# Patient Record
Sex: FEMALE | Race: WHITE | NOT HISPANIC OR LATINO | Employment: OTHER
[De-identification: names, ages, dates, MRNs, and addresses within clinical notes are randomized per-mention and may not be internally consistent; named-entity substitution may affect disease eponyms.]

---

## 2019-10-29 ENCOUNTER — RECORDS - HEALTHEAST (OUTPATIENT)
Dept: ADMINISTRATIVE | Facility: OTHER | Age: 64
End: 2019-10-29

## 2019-10-30 ENCOUNTER — COMMUNICATION - HEALTHEAST (OUTPATIENT)
Dept: TELEHEALTH | Facility: CLINIC | Age: 64
End: 2019-10-30

## 2019-10-30 ENCOUNTER — RECORDS - HEALTHEAST (OUTPATIENT)
Dept: VASCULAR ULTRASOUND | Facility: CLINIC | Age: 64
End: 2019-10-30

## 2019-10-30 DIAGNOSIS — M25.469 EFFUSION, UNSPECIFIED KNEE: ICD-10-CM

## 2022-10-10 ENCOUNTER — OFFICE VISIT (OUTPATIENT)
Dept: NEUROLOGY | Facility: CLINIC | Age: 67
End: 2022-10-10
Payer: COMMERCIAL

## 2022-10-10 ENCOUNTER — APPOINTMENT (OUTPATIENT)
Dept: LAB | Facility: CLINIC | Age: 67
End: 2022-10-10
Payer: COMMERCIAL

## 2022-10-10 VITALS — HEART RATE: 66 BPM | SYSTOLIC BLOOD PRESSURE: 113 MMHG | DIASTOLIC BLOOD PRESSURE: 86 MMHG

## 2022-10-10 DIAGNOSIS — G35 MS (MULTIPLE SCLEROSIS) (H): Primary | ICD-10-CM

## 2022-10-10 DIAGNOSIS — M79.2 NEUROPATHIC PAIN: ICD-10-CM

## 2022-10-10 DIAGNOSIS — E55.9 VITAMIN D DEFICIENCY: ICD-10-CM

## 2022-10-10 DIAGNOSIS — N31.9 NEUROGENIC BLADDER: ICD-10-CM

## 2022-10-10 PROCEDURE — 84165 PROTEIN E-PHORESIS SERUM: CPT | Mod: TC | Performed by: PATHOLOGY

## 2022-10-10 PROCEDURE — 82306 VITAMIN D 25 HYDROXY: CPT | Performed by: PSYCHIATRY & NEUROLOGY

## 2022-10-10 PROCEDURE — 84155 ASSAY OF PROTEIN SERUM: CPT | Performed by: PSYCHIATRY & NEUROLOGY

## 2022-10-10 PROCEDURE — 82607 VITAMIN B-12: CPT | Performed by: PSYCHIATRY & NEUROLOGY

## 2022-10-10 PROCEDURE — 82746 ASSAY OF FOLIC ACID SERUM: CPT | Performed by: PSYCHIATRY & NEUROLOGY

## 2022-10-10 PROCEDURE — 99215 OFFICE O/P EST HI 40 MIN: CPT | Mod: GC | Performed by: PSYCHIATRY & NEUROLOGY

## 2022-10-10 RX ORDER — GLATIRAMER ACETATE 20 MG/ML
20 INJECTION, SOLUTION SUBCUTANEOUS DAILY
COMMUNITY
Start: 2022-03-24 | End: 2024-06-20

## 2022-10-10 RX ORDER — TRIAMCINOLONE ACETONIDE 1 MG/G
CREAM TOPICAL 2 TIMES DAILY
COMMUNITY
Start: 2022-07-21 | End: 2024-06-20

## 2022-10-10 RX ORDER — LEVOTHYROXINE SODIUM 100 UG/1
1 TABLET ORAL DAILY
COMMUNITY
Start: 2020-11-11

## 2022-10-10 RX ORDER — MIRABEGRON 25 MG/1
25 TABLET, FILM COATED, EXTENDED RELEASE ORAL DAILY
Qty: 30 TABLET | Refills: 4 | Status: SHIPPED | OUTPATIENT
Start: 2022-10-10 | End: 2022-10-17 | Stop reason: SINTOL

## 2022-10-10 NOTE — NURSING NOTE
Chief Complaint   Patient presents with     Consult For     MS  Worsening symptoms since last knee surgery

## 2022-10-10 NOTE — LETTER
"    10/10/2022         RE: Stacey Garcia  [address] [facility] [address]        Dear Colleague,    Thank you for referring your patient, Stacey Garcia, to the [facility]. Please see a copy of my visit note below.      Neurology Clinic Visit      Consult requested by: No referring provider defined for this encounter.  Reason: Multiple Sclerosis       10/10/2022   Source of information: Patient and chart review    History of Present Symptom:  Stacey Garcia is a 67 year old female with a PMH significant for thyroiditis, asthma who presents for follow up for multiple sclerosis. She followed previously with Dr. Limon at [facility].      She did visit with Dr. Andujar in August where she reported worsening pressure/tight sensation and electric pulse in her left legs affecting her walking. They started gabapentin and scheduled an MRI. Gabapentin causes significant side effects, particularly this made her muscle spasms worse.      Today she reports legs feel like they are wrapped in tightly coiled snakes. Describes a lot of tightness and resistance to normal movement. \"Like the tightest therapy band is holding her leg down.\" She is tiring out much more easily. She reports this has really worsened since her left knee replacement about one year ago. Mildly painful but more significantly causes difficulty balancing and walking. Symptoms are worse in bed or when first standing. Tingling/prickling sensation in her legs with pressure.     Unable to get up from the ground. She had a fall a few weeks ago and had to use furniture to get back up. This is also new since knee surgery last year as well.     She can walk at least 3 blocks, requires assistance from her  for balance. She does have a cane but arthritis causes pain. She is having muscle spasm, throughout her whole body. These are infrequent.     Disease onset: age 35, R ON  Last relapse: 8/2018, age 63 T 7 myelitis GD " +  Prior DMDs:   Copaxone daily 2002 - 2015, injection fatigue  Copaxone 40 mg X2 injections in late 2015, but experienced significant pain with injections  Glatiramer 40 mg 12/2018 - present  Tolerating this well.       Symptom management:  Fatigue:Wwell managed.  Bowel/bladder changes: Follows with urology, she did pelvic floor therapy. Frequently urinating (every 2 hours) occasionally can't make it in time.    Gait/balance: Has been doing exercises from PT for her knee. No longer exercising regularly, significant fatigue.     The patient's medical, surgical, social, and family history were personally reviewed with the patient.  No past medical history on file.   No past surgical history on file.     No family history on file.  No current outpatient medications on file.     No current facility-administered medications for this visit.     Not on File      Review of Systems:  14-point review of systems was completed. The pertinent positives and negatives are in the HPI.    Physical Examination   Vitals: There were no vitals taken for this visit.  General: Patient appears comfortable in no acute distress.   HEENT: NC/AT, no icterus, moist mucous membranes  Chest: non-labored on RA  Extremities: Warm, no edema  Skin: No rash or lesion   Psych: Affect appropriate for situation   Neuro:  Mental status: Awake, alert, attentive. Language is fluent with intact comprehension of commands.  Cranial nerves: conjugate gaze, EOMI, visual fields intact, face symmetric, shoulder shrug strong, tongue protrusion/uvula midline, no dysarthria.   Motor: Normal muscle bulk and tone. No abnormal movements.       R L  Deltoid  5 5  Biceps  5 5  Triceps 5 5  Wrist ext 5 5  Finger ext 5 5  Finger abd 5 5    Hip flexion 5 5  Knee flexion 5 5  Knee ext 5 5  Dorsiflexion 5 5    Reflexes: Brisk reflexes symmetric in biceps, brachioradialis, absent at left patellae, 1+ right, and 1+achilles.   Sensory: Intact to light touch, vibration mildly  reduced. Romberg is negative with some sawy.   Coordination: FNF without ataxia or dysmetria.    Gait: Wide based spastic with circumduction left leg. Tandem walk unable to be performed.     Laboratory:  All laboratory data reviewed    [email or web address]    MRI brain stable as compared to 2020    MRI c-spine stable with mild spondylosis and canal and foraminal stenosis     MRI t-spine stable T3/T4 and T10/T11 lesions     Assessment/Plan:  Stacey Garcia is a 67 year old female who presents for follow-up for multiple sclerosis.  She has been stable on glatiramer acetate for many years and is tolerating this well.  She did have an relapse around age 63 with a contrast-enhancing lesion on MRI.    We discussed her neuropathic type pain symptoms in detail.  She did not tolerate gabapentin in the past.  She is also having difficulty with sleep.  We discussed risks and benefits of amitriptyline for nerve pain.  We also discussed other options like carbamazepine or duloxetine if this does not work.  Lastly could consider baclofen however I think the majority of her symptoms are related to neuropathic pain and less muscle spasticity.    We discussed her urinary symptoms.  She does not remember trying any medications in the past however oxybutynin was listed as ineffective in the past by urology.  We will do a trial of Myrbetriq for her urge incontinence.    -Start amitriptyline  -Start Myrbetriq  -Blood work including vitamin D and other potential causes of worsening balance including B12, folate, SPEP  -Follow-up in 3 to 4 months    Patient seen and discussed with Dr. Limon.  I have reviewed the plan with the patient, who is in agreement.      Ledy Pastor DO  Multiple Sclerosis Fellow             Attestation signed by Rashida Limon MD at 10/17/2022  8:51 AM:  I personally saw and evaluated Ms. Garcia with Dr. Pastor on the date of service.  I have reviewed the above documentation and agree with the findings  and recommendations.     A total of 40 minutes were personally spent in the care of this patient on the date of service.     Rashida Limon MD on 10/17/2022 at 8:50 AM        Again, thank you for allowing me to participate in the care of your patient.        Sincerely,        Rashida Limon MD

## 2022-10-10 NOTE — PROGRESS NOTES
"  Neurology Clinic Visit      Consult requested by: No referring provider defined for this encounter.  Reason: Multiple Sclerosis       10/10/2022   Source of information: Patient and chart review    History of Present Symptom:  Stacey Garcia is a 67 year old female with a PMH significant for thyroiditis, asthma who presents for follow up for multiple sclerosis. She followed previously with Dr. Limon at [facility].      She did visit with Dr. Andujar in August where she reported worsening pressure/tight sensation and electric pulse in her left legs affecting her walking. They started gabapentin and scheduled an MRI. Gabapentin causes significant side effects, particularly this made her muscle spasms worse.      Today she reports legs feel like they are wrapped in tightly coiled snakes. Describes a lot of tightness and resistance to normal movement. \"Like the tightest therapy band is holding her leg down.\" She is tiring out much more easily. She reports this has really worsened since her left knee replacement about one year ago. Mildly painful but more significantly causes difficulty balancing and walking. Symptoms are worse in bed or when first standing. Tingling/prickling sensation in her legs with pressure.     Unable to get up from the ground. She had a fall a few weeks ago and had to use furniture to get back up. This is also new since knee surgery last year as well.     She can walk at least 3 blocks, requires assistance from her  for balance. She does have a cane but arthritis causes pain. She is having muscle spasm, throughout her whole body. These are infrequent.     Disease onset: age 35, R ON  Last relapse: 8/2018, age 63 T 7 myelitis GD +  Prior DMDs:   Copaxone daily 2002 - 2015, injection fatigue  Copaxone 40 mg X2 injections in late 2015, but experienced significant pain with injections  Glatiramer 40 mg 12/2018 - present  Tolerating this well.       Symptom management:  Fatigue:Wwell " managed.  Bowel/bladder changes: Follows with urology, she did pelvic floor therapy. Frequently urinating (every 2 hours) occasionally can't make it in time.    Gait/balance: Has been doing exercises from PT for her knee. No longer exercising regularly, significant fatigue.     The patient's medical, surgical, social, and family history were personally reviewed with the patient.  No past medical history on file.   No past surgical history on file.     No family history on file.  No current outpatient medications on file.     No current facility-administered medications for this visit.     Not on File      Review of Systems:  14-point review of systems was completed. The pertinent positives and negatives are in the HPI.    Physical Examination   Vitals: There were no vitals taken for this visit.  General: Patient appears comfortable in no acute distress.   HEENT: NC/AT, no icterus, moist mucous membranes  Chest: non-labored on RA  Extremities: Warm, no edema  Skin: No rash or lesion   Psych: Affect appropriate for situation   Neuro:  Mental status: Awake, alert, attentive. Language is fluent with intact comprehension of commands.  Cranial nerves: conjugate gaze, EOMI, visual fields intact, face symmetric, shoulder shrug strong, tongue protrusion/uvula midline, no dysarthria.   Motor: Normal muscle bulk and tone. No abnormal movements.       R L  Deltoid  5 5  Biceps  5 5  Triceps 5 5  Wrist ext 5 5  Finger ext 5 5  Finger abd 5 5    Hip flexion 5 5  Knee flexion 5 5  Knee ext 5 5  Dorsiflexion 5 5    Reflexes: Brisk reflexes symmetric in biceps, brachioradialis, absent at left patellae, 1+ right, and 1+achilles.   Sensory: Intact to light touch, vibration mildly reduced. Romberg is negative with some sawy.   Coordination: FNF without ataxia or dysmetria.    Gait: Wide based spastic with circumduction left leg. Tandem walk unable to be performed.     Laboratory:  All laboratory data  reviewed    Imagin2022    MRI brain stable as compared to 2020    MRI c-spine stable with mild spondylosis and canal and foraminal stenosis     MRI t-spine stable T3/T4 and T10/T11 lesions     Assessment/Plan:  Stacey Garcia is a 67 year old female who presents for follow-up for multiple sclerosis.  She has been stable on glatiramer acetate for many years and is tolerating this well.  She did have an relapse around age 63 with a contrast-enhancing lesion on MRI.    We discussed her neuropathic type pain symptoms in detail.  She did not tolerate gabapentin in the past.  She is also having difficulty with sleep.  We discussed risks and benefits of amitriptyline for nerve pain.  We also discussed other options like carbamazepine or duloxetine if this does not work.  Lastly could consider baclofen however I think the majority of her symptoms are related to neuropathic pain and less muscle spasticity.    We discussed her urinary symptoms.  She does not remember trying any medications in the past however oxybutynin was listed as ineffective in the past by urology.  We will do a trial of Myrbetriq for her urge incontinence.    -Start amitriptyline  -Start Myrbetriq  -Blood work including vitamin D and other potential causes of worsening balance including B12, folate, SPEP  -Follow-up in 3 to 4 months    Patient seen and discussed with Dr. Limon.  I have reviewed the plan with the patient, who is in agreement.      Ledy Pastor,   Multiple Sclerosis Fellow

## 2022-10-10 NOTE — PATIENT INSTRUCTIONS
Continue glatiramer   Switching to a different MS medication will only put you at increased risk for infection, rather than much benefit for MS     Try amitriptyline for the nerve pain   Start with 1 tablet at bedtime for 2 weeks, increase to 2 if pain not controlled    Try myrbetriq for the bladder    Blood work today    Follow up in 3-4 months

## 2022-10-12 ENCOUNTER — TELEPHONE (OUTPATIENT)
Dept: NEUROLOGY | Facility: CLINIC | Age: 67
End: 2022-10-12

## 2022-10-12 PROCEDURE — 84165 PROTEIN E-PHORESIS SERUM: CPT | Mod: 26

## 2022-10-12 NOTE — TELEPHONE ENCOUNTER
Pt was contacted and she was informed of her lab results.    DEQUAN Leos on 10/12/2022 at 10:52 AM

## 2022-10-12 NOTE — TELEPHONE ENCOUNTER
----- Message from Rashida Limon MD sent at 10/11/2022  4:04 PM CDT -----  Please notify patient of normal vitamin D, b12, and folate levels. Rashida Limon MD

## 2022-10-15 ENCOUNTER — HEALTH MAINTENANCE LETTER (OUTPATIENT)
Age: 67
End: 2022-10-15

## 2023-01-16 ENCOUNTER — OFFICE VISIT (OUTPATIENT)
Dept: NEUROLOGY | Facility: CLINIC | Age: 68
End: 2023-01-16
Payer: COMMERCIAL

## 2023-01-16 VITALS — SYSTOLIC BLOOD PRESSURE: 92 MMHG | HEART RATE: 62 BPM | DIASTOLIC BLOOD PRESSURE: 62 MMHG

## 2023-01-16 DIAGNOSIS — G82.20 PARAPARESIS (H): ICD-10-CM

## 2023-01-16 DIAGNOSIS — G35 MS (MULTIPLE SCLEROSIS) (H): Primary | ICD-10-CM

## 2023-01-16 DIAGNOSIS — M79.2 NEUROPATHIC PAIN: ICD-10-CM

## 2023-01-16 DIAGNOSIS — R26.81 GAIT INSTABILITY: ICD-10-CM

## 2023-01-16 PROCEDURE — 99214 OFFICE O/P EST MOD 30 MIN: CPT | Performed by: PSYCHIATRY & NEUROLOGY

## 2023-01-16 RX ORDER — OXCARBAZEPINE 150 MG/1
150 TABLET, FILM COATED ORAL 2 TIMES DAILY
Qty: 60 TABLET | Refills: 4 | Status: SHIPPED | OUTPATIENT
Start: 2023-01-16 | End: 2023-05-18

## 2023-01-16 NOTE — LETTER
1/16/2023         RE: Stacey Garcia  [address] [facility] [place] [id number]        Dear Colleague,    Thank you for referring your patient, Stacey Garcia, to the [facility]. Please see a copy of my visit note below.    Date of Service: 1/16/2023    [facility] Neurology   MS Clinic Evaluation    Subjective: 67-year-old woman with thyroiditis, asthma, who presents in follow-up for multiple sclerosis.    She continues to struggle with her legs.  Recall that at her last visit I recommended a trial of amitriptyline and Myrbetriq.  Amitriptyline was helpful at first, but then she noticed that it was causing tremors and her legs felt worse.  She elected to come off of the medication.  Myrbetriq may have contributed to the side effects as well, but it is unclear as she stopped this medication before she stopped amitriptyline.  She did not take Myrbetriq by itself.    Since her last visit with me she has changed her diet.  She completed a 5-day detox diet, now is proceeding with intermittent fasting.  She has to do a lot of food preparation.  She struggles to stand to prepare food because she will experience soreness in her legs.    She continues to have discomfort in the lower waist.  This is a tense sensation.  She has an electrical sensation in her legs.  And she has intermittent full body spasms that can be triggered by small movements such as moving her finger.  When she is in a grocery store she holds onto the cart for stability.  She does not feel confident in her gait.  She has difficulty getting up because of knee pain.    She is open to trying a different medication for the nerve pain as this continues to significantly impact her daily life.    Disease onset: age 35, R ON  Last relapse: 8/2018, age 63 T 7 myelitis GD +  Prior DMDs:   Copaxone daily 2002 - 2015, injection fatigue  Copaxone 40 mg X2 injections in late 2015, but experienced significant pain with injections  Glatiramer 40  mg 12/2018 - present      Allergies   Allergen Reactions     Sulfa Drugs        Current Outpatient Medications   Medication     calcium carbonate-vitamin D (OSCAL W/D) 500-200 MG-UNIT tablet     cholecalciferol 25 MCG (1000 UT) TABS     glatiramer (COPAXONE) 20 MG/ML injection     levothyroxine (SYNTHROID/LEVOTHROID) 100 MCG tablet     vitamin B-12 (CYANOCOBALAMIN) 1000 MCG tablet     amitriptyline (ELAVIL) 25 MG tablet     tolterodine ER (DETROL LA) 2 MG 24 hr capsule     triamcinolone (KENALOG) 0.1 % external cream     No current facility-administered medications for this visit.        Past medical, surgical, social and family history was personally reviewed. Pertinent details noted above.     Physical Examination:   BP 92/62 (BP Location: Right arm, Patient Position: Sitting)   Pulse 62     General: no acute distress  Cranial nerves:   VFFC  PER b/l  EOM full w/no SAMINA   Face symmetric  Hearing intact  No dysarthria   Motor:   Tone is normal   Bulk is normal     R L  Deltoid  5 5  Biceps  5 5  Triceps 5 5  Wrist ext 5 5  Finger ext 5- 5-  Finger abd 5 5    Hip flexion 4+ 4+  Knee flexion 5 5  Knee ext 5 5  Ankle d/f 5- 4+    Reflexes: brisk in the LE, no ankle clonus  Sensory: vibration is mildly reduced in the toes  Romberg is absent  Coordination: no ataxia or dysmetria  Gait: spastic paraparetic gait    Tests/Imaging:   Vitamin D 48  B12 483    MRI brain   7/2006 - personally reviewed, overall low lesion burden, few faint periventricular lesions, some dirty white matter in cerebellar peduncles b/l, gd-  10/2011 - no new lesions, gd-  5/2014 - no new lesions, gd-  2/2019 - no new lesions, gd-  9/2022 - no new lesions, gd-     MRI cervical spine  5/2014- personally reviewed, dorsal C3, L lat C4/5, ?R lat C5/C6, central C7/T1, gd-, compared to 2006, possibly lesions at R c5/6 and central C7/t1 are new in 2014 but quality hard to compare  2/2019 - 2 new lesions w/in the right lateral cord, gd-  9/2022 - no new  lesions, gd-     MRI thoracic - not previously done  2/2019 - approximately 5 lesions, 1 mid thoracic cord lesion gd+ anterior cord   9/2022 - no new lesions, gd-     Assessment: 67-year-old woman with chronic multiple sclerosis who has progressive symptoms related to old lesions.  Radiologically she has been stable.    I recommended that she work with physical therapy to address her gait.  They will also be helpful for management of spasticity.    We discussed a trial of carbamazepine for paroxysmal symptoms related to multiple sclerosis.  Due to an interaction with levothyroxine I recommended oxcarbazepine.  Common side effects were discussed.    Plan:   -Oxcarbazepine 150 mg nightly for 1 week, then take 150 mg twice per day  - Referral to physical therapy  - Follow-up in 4 months    Note was completed with the assistance of Dragon Fluency software which can often result in accidental word substitutions.     A total of 30 minutes on the date of service were spent in the care of this patient.   Rashida Limon MD on 1/16/2023 at 9:35 AM          Again, thank you for allowing me to participate in the care of your patient.        Sincerely,        Rashida Limon MD

## 2023-01-16 NOTE — PATIENT INSTRUCTIONS
Work with physical therapy on walking/stability   STEP therapy is recommended     Try oxcarbazepine for your nerve pain/spasms/vice  sensation   Start with 1 capsule at bedtime for 1 week, then take 1 twice per day   Let me know if you struggle with dizziness or worsening of your walking    Follow up in 4 months

## 2023-01-16 NOTE — PROGRESS NOTES
Date of Service: 1/16/2023    [facility] Neurology   MS Clinic Evaluation    Subjective: 67-year-old woman with thyroiditis, asthma, who presents in follow-up for multiple sclerosis.    She continues to struggle with her legs.  Recall that at her last visit I recommended a trial of amitriptyline and Myrbetriq.  Amitriptyline was helpful at first, but then she noticed that it was causing tremors and her legs felt worse.  She elected to come off of the medication.  Myrbetriq may have contributed to the side effects as well, but it is unclear as she stopped this medication before she stopped amitriptyline.  She did not take Myrbetriq by itself.    Since her last visit with me she has changed her diet.  She completed a 5-day detox diet, now is proceeding with intermittent fasting.  She has to do a lot of food preparation.  She struggles to stand to prepare food because she will experience soreness in her legs.    She continues to have discomfort in the lower waist.  This is a tense sensation.  She has an electrical sensation in her legs.  And she has intermittent full body spasms that can be triggered by small movements such as moving her finger.  When she is in a grocery store she holds onto the cart for stability.  She does not feel confident in her gait.  She has difficulty getting up because of knee pain.    She is open to trying a different medication for the nerve pain as this continues to significantly impact her daily life.    Disease onset: age 35, R ON  Last relapse: 8/2018, age 63 T 7 myelitis GD +  Prior DMDs:   Copaxone daily 2002 - 2015, injection fatigue  Copaxone 40 mg X2 injections in late 2015, but experienced significant pain with injections  Glatiramer 40 mg 12/2018 - present      Allergies   Allergen Reactions     Sulfa Drugs        Current Outpatient Medications   Medication     calcium carbonate-vitamin D (OSCAL W/D) 500-200 MG-UNIT tablet     cholecalciferol 25 MCG (1000 UT) TABS     glatiramer  (COPAXONE) 20 MG/ML injection     levothyroxine (SYNTHROID/LEVOTHROID) 100 MCG tablet     vitamin B-12 (CYANOCOBALAMIN) 1000 MCG tablet     amitriptyline (ELAVIL) 25 MG tablet     tolterodine ER (DETROL LA) 2 MG 24 hr capsule     triamcinolone (KENALOG) 0.1 % external cream     No current facility-administered medications for this visit.        Past medical, surgical, social and family history was personally reviewed. Pertinent details noted above.     Physical Examination:   BP 92/62 (BP Location: Right arm, Patient Position: Sitting)   Pulse 62     General: no acute distress  Cranial nerves:   VFFC  PER b/l  EOM full w/no SAMINA   Face symmetric  Hearing intact  No dysarthria   Motor:   Tone is normal   Bulk is normal     R L  Deltoid  5 5  Biceps  5 5  Triceps 5 5  Wrist ext 5 5  Finger ext 5- 5-  Finger abd 5 5    Hip flexion 4+ 4+  Knee flexion 5 5  Knee ext 5 5  Ankle d/f 5- 4+    Reflexes: brisk in the LE, no ankle clonus  Sensory: vibration is mildly reduced in the toes  Romberg is absent  Coordination: no ataxia or dysmetria  Gait: spastic paraparetic gait    Tests/Imaging:   Vitamin D 48  B12 483    MRI brain   7/2006 - personally reviewed, overall low lesion burden, few faint periventricular lesions, some dirty white matter in cerebellar peduncles b/l, gd-  10/2011 - no new lesions, gd-  5/2014 - no new lesions, gd-  2/2019 - no new lesions, gd-  9/2022 - no new lesions, gd-     MRI cervical spine  5/2014- personally reviewed, dorsal C3, L lat C4/5, ?R lat C5/C6, central C7/T1, gd-, compared to 2006, possibly lesions at R c5/6 and central C7/t1 are new in 2014 but quality hard to compare  2/2019 - 2 new lesions w/in the right lateral cord, gd-  9/2022 - no new lesions, gd-     MRI thoracic - not previously done  2/2019 - approximately 5 lesions, 1 mid thoracic cord lesion gd+ anterior cord   9/2022 - no new lesions, gd-     Assessment: 67-year-old woman with chronic multiple sclerosis who has progressive  symptoms related to old lesions.  Radiologically she has been stable.    I recommended that she work with physical therapy to address her gait.  They will also be helpful for management of spasticity.    We discussed a trial of carbamazepine for paroxysmal symptoms related to multiple sclerosis.  Due to an interaction with levothyroxine I recommended oxcarbazepine.  Common side effects were discussed.    Plan:   -Oxcarbazepine 150 mg nightly for 1 week, then take 150 mg twice per day  - Referral to physical therapy  - Follow-up in 4 months    Note was completed with the assistance of Dragon Fluency software which can often result in accidental word substitutions.     A total of 30 minutes on the date of service were spent in the care of this patient.   Rashida Limon MD on 1/16/2023 at 9:35 AM

## 2023-01-25 ENCOUNTER — TRANSFERRED RECORDS (OUTPATIENT)
Dept: HEALTH INFORMATION MANAGEMENT | Facility: CLINIC | Age: 68
End: 2023-01-25

## 2023-05-04 ENCOUNTER — TRANSFERRED RECORDS (OUTPATIENT)
Dept: HEALTH INFORMATION MANAGEMENT | Facility: CLINIC | Age: 68
End: 2023-05-04
Payer: COMMERCIAL

## 2023-05-04 ENCOUNTER — DOCUMENTATION ONLY (OUTPATIENT)
Dept: NEUROLOGY | Facility: CLINIC | Age: 68
End: 2023-05-04
Payer: COMMERCIAL

## 2023-05-05 ENCOUNTER — MYC MEDICAL ADVICE (OUTPATIENT)
Dept: NEUROLOGY | Facility: CLINIC | Age: 68
End: 2023-05-05
Payer: COMMERCIAL

## 2023-05-05 DIAGNOSIS — G35 MS (MULTIPLE SCLEROSIS) (H): Primary | ICD-10-CM

## 2023-05-09 RX ORDER — METHYLPREDNISOLONE 4 MG
TABLET, DOSE PACK ORAL
Qty: 42 TABLET | Refills: 0 | Status: SHIPPED | OUTPATIENT
Start: 2023-05-09 | End: 2024-06-20

## 2023-05-18 ENCOUNTER — OFFICE VISIT (OUTPATIENT)
Dept: NEUROLOGY | Facility: CLINIC | Age: 68
End: 2023-05-18
Payer: COMMERCIAL

## 2023-05-18 VITALS
SYSTOLIC BLOOD PRESSURE: 110 MMHG | WEIGHT: 158 LBS | DIASTOLIC BLOOD PRESSURE: 78 MMHG | BODY MASS INDEX: 23.95 KG/M2 | HEIGHT: 68 IN | HEART RATE: 58 BPM

## 2023-05-18 DIAGNOSIS — G35 MS (MULTIPLE SCLEROSIS) (H): Primary | ICD-10-CM

## 2023-05-18 PROCEDURE — 99214 OFFICE O/P EST MOD 30 MIN: CPT | Performed by: PSYCHIATRY & NEUROLOGY

## 2023-05-18 NOTE — NURSING NOTE
Chief Complaint   Patient presents with     MS     Follow up     Clarissa Cameron on 5/18/2023 at 9:21 AM

## 2023-05-18 NOTE — PATIENT INSTRUCTIONS
You reported benefit from steroids     This makes me believe that there is still some inflammation contributing to your symptoms     I recommend you consider ocrevus or kesimpta   These medications do weaken your immune system and can increase your risks with covid -- however, covid has not been as troublesome for my patients in the past year     Let me know if you would like to try either medication     Follow up in 3-4 months

## 2023-05-18 NOTE — PROGRESS NOTES
Date of Service: 5/18/2023    [facility] Neurology   MS Clinic Evaluation    Subjective: 67-year-old woman with thyroiditis, asthma, who presents in follow-up for multiple sclerosis.    Since her last visit with me she has been struggling with her legs.  She has been working with step physical therapy.  This has been quite helpful for her.  They have been focusing on balance and improving her lower extremity strength.  Unfortunately, she experienced worsening symptoms a few weeks ago.  She reached out to me and a double Medrol Dosepak was provided.  When she took the steroid she experienced less pain, reduce spasms, and most importantly felt more confident in her walking/with her balance.  She is particularly concerned when walking outdoors.  She has concerns about onset even surfaces.  She notes that her left knee has a tendency to hyperextend.    She continues to be cautious about COVID-19.  She has not had a definite illness.  She has concerns about what a COVID illness might due to her health.    Recall that after her last visit I recommended a trial of oxcarbazepine.  Unfortunately she had an adverse reaction.  She actually experienced increased pain and tightness in the lower extremities when she took this medication.  She has therefore discontinued the medication.    Disease onset: age 35, R ON  Last relapse: 8/2018, age 63 T 7 myelitis GD +  Prior DMDs:   Copaxone daily 2002 - 2015, injection fatigue  Copaxone 40 mg X2 injections in late 2015, but experienced significant pain with injections  Glatiramer 40 mg 12/2018 - present      Allergies   Allergen Reactions     Sulfa Antibiotics        Current Outpatient Medications   Medication     calcium carbonate-vitamin D (OSCAL W/D) 500-200 MG-UNIT tablet     cholecalciferol 25 MCG (1000 UT) TABS     glatiramer (COPAXONE) 20 MG/ML injection     levothyroxine (SYNTHROID/LEVOTHROID) 100 MCG tablet     methylPREDNISolone (MEDROL DOSEPAK) 4 MG tablet therapy pack      "OXcarbazepine (TRILEPTAL) 150 MG tablet     tolterodine ER (DETROL LA) 2 MG 24 hr capsule     triamcinolone (KENALOG) 0.1 % external cream     vitamin B-12 (CYANOCOBALAMIN) 1000 MCG tablet     No current facility-administered medications for this visit.        Past medical, surgical, social and family history was personally reviewed. Pertinent details noted above.     Physical Examination:   /78   Pulse 58   Ht 1.727 m (5' 8\")   Wt 71.7 kg (158 lb)   BMI 24.02 kg/m      General: no acute distress  Cranial nerves:   VFFC  PER b/l  EOM full w/no SAMINA   Face symmetric  Hearing intact  No dysarthria   Motor:   Tone is normal   Bulk is normal     R L  Finger ext 5- 5-  Finger abd 5 5    Hip flexion 4+ 4  Knee flexion 5 5  Knee ext 5 5  Ankle d/f 5- 4+    Reflexes: brisk in the LE, no ankle clonus  Sensory: vibration is mildly reduced in the ankles  Romberg is absent  Coordination: mild ataxia LLE  Gait: spastic ataxic gait    Tests/Imaging:   Vitamin D 48  B12 483    MRI brain   7/2006 - personally reviewed, overall low lesion burden, few faint periventricular lesions, some dirty white matter in cerebellar peduncles b/l, gd-  10/2011 - no new lesions, gd-  5/2014 - no new lesions, gd-  2/2019 - no new lesions, gd-  9/2022 - no new lesions, gd-     MRI cervical spine  5/2014- personally reviewed, dorsal C3, L lat C4/5, ?R lat C5/C6, central C7/T1, gd-, compared to 2006, possibly lesions at R c5/6 and central C7/t1 are new in 2014 but quality hard to compare  2/2019 - 2 new lesions w/in the right lateral cord, gd-  9/2022 - no new lesions, gd-     MRI thoracic - not previously done  2/2019 - approximately 5 lesions, 1 mid thoracic cord lesion gd+ anterior cord   9/2022 - no new lesions, gd-     Assessment: 67-year-old woman with chronic multiple sclerosis who has progressive symptoms related to old lesions.  Radiologically she has been stable.    Given that she has gotten benefit from prednisone I have concerns " that she has ongoing inflammatory activity despite stable radiologic findings.  We had an extensive discussion regarding the risks and benefits of trying a more potent disease modifying therapy.  The ideal medication for her current clinical course would be an anti-CD20 antibody.  We therefore reviewed risks and benefits of Ocrevus and kesimpta in detail.  I did warn that both medications can increase risks associated with COVID-19, though reassurance was provided that COVID-19 has not been as problematic for my patients in the past year.  She will review these medications further and will notify me if she would like to give them a try.  She is aware that recurrent use of steroids comes with risks as well.    Plan:   -Continue Copaxone  - Consider Ocrevus or kesimpta  - Follow-up in 3 to 4 months    Note was completed with the assistance of Dragon Fluency software which can often result in accidental word substitutions.     A total of 30 minutes on the date of service were spent in the care of this patient.   Rashida Limon MD on 5/18/2023 at 9:45 AM         unintentional

## 2023-05-18 NOTE — LETTER
5/18/2023         RE: Stacey Garcia  [address] [facility] [address]        Dear Colleague,    Thank you for referring your patient, Stacey Garcia, to the [facility] NEUROLOGY CLINIC [facility]. Please see a copy of my visit note below.    Date of Service: 5/18/2023    [facility] Neurology   MS Clinic Evaluation    Subjective: 67-year-old woman with thyroiditis, asthma, who presents in follow-up for multiple sclerosis.    Since her last visit with me she has been struggling with her legs.  She has been working with step physical therapy.  This has been quite helpful for her.  They have been focusing on balance and improving her lower extremity strength.  Unfortunately, she experienced worsening symptoms a few weeks ago.  She reached out to me and a double Medrol Dosepak was provided.  When she took the steroid she experienced less pain, reduce spasms, and most importantly felt more confident in her walking/with her balance.  She is particularly concerned when walking outdoors.  She has concerns about onset even surfaces.  She notes that her left knee has a tendency to hyperextend.    She continues to be cautious about COVID-19.  She has not had a definite illness.  She has concerns about what a COVID illness might due to her health.    Recall that after her last visit I recommended a trial of oxcarbazepine.  Unfortunately she had an adverse reaction.  She actually experienced increased pain and tightness in the lower extremities when she took this medication.  She has therefore discontinued the medication.    Disease onset: age 35, R ON  Last relapse: 8/2018, age 63 T 7 myelitis GD +  Prior DMDs:   Copaxone daily 2002 - 2015, injection fatigue  Copaxone 40 mg X2 injections in late 2015, but experienced significant pain with injections  Glatiramer 40 mg 12/2018 - present      Allergies   Allergen Reactions     Sulfa Antibiotics        Current Outpatient Medications   Medication     calcium  "carbonate-vitamin D (OSCAL W/D) 500-200 MG-UNIT tablet     cholecalciferol 25 MCG (1000 UT) TABS     glatiramer (COPAXONE) 20 MG/ML injection     levothyroxine (SYNTHROID/LEVOTHROID) 100 MCG tablet     methylPREDNISolone (MEDROL DOSEPAK) 4 MG tablet therapy pack     OXcarbazepine (TRILEPTAL) 150 MG tablet     tolterodine ER (DETROL LA) 2 MG 24 hr capsule     triamcinolone (KENALOG) 0.1 % external cream     vitamin B-12 (CYANOCOBALAMIN) 1000 MCG tablet     No current facility-administered medications for this visit.        Past medical, surgical, social and family history was personally reviewed. Pertinent details noted above.     Physical Examination:   /78   Pulse 58   Ht 1.727 m (5' 8\")   Wt 71.7 kg (158 lb)   BMI 24.02 kg/m      General: no acute distress  Cranial nerves:   VFFC  PER b/l  EOM full w/no SAMIAN   Face symmetric  Hearing intact  No dysarthria   Motor:   Tone is normal   Bulk is normal     R L  Finger ext 5- 5-  Finger abd 5 5    Hip flexion 4+ 4  Knee flexion 5 5  Knee ext 5 5  Ankle d/f 5- 4+    Reflexes: brisk in the LE, no ankle clonus  Sensory: vibration is mildly reduced in the ankles  Romberg is absent  Coordination: mild ataxia LLE  Gait: spastic ataxic gait    Tests/Imaging:   Vitamin D 48  B12 483    MRI brain   7/2006 - personally reviewed, overall low lesion burden, few faint periventricular lesions, some dirty white matter in cerebellar peduncles b/l, gd-  10/2011 - no new lesions, gd-  5/2014 - no new lesions, gd-  2/2019 - no new lesions, gd-  9/2022 - no new lesions, gd-     MRI cervical spine  5/2014- personally reviewed, dorsal C3, L lat C4/5, ?R lat C5/C6, central C7/T1, gd-, compared to 2006, possibly lesions at R c5/6 and central C7/t1 are new in 2014 but quality hard to compare  2/2019 - 2 new lesions w/in the right lateral cord, gd-  9/2022 - no new lesions, gd-     MRI thoracic - not previously done  2/2019 - approximately 5 lesions, 1 mid thoracic cord lesion gd+ " anterior cord   9/2022 - no new lesions, gd-     Assessment: 67-year-old woman with chronic multiple sclerosis who has progressive symptoms related to old lesions.  Radiologically she has been stable.    Given that she has gotten benefit from prednisone I have concerns that she has ongoing inflammatory activity despite stable radiologic findings.  We had an extensive discussion regarding the risks and benefits of trying a more potent disease modifying therapy.  The ideal medication for her current clinical course would be an anti-CD20 antibody.  We therefore reviewed risks and benefits of Ocrevus and kesimpta in detail.  I did warn that both medications can increase risks associated with COVID-19, though reassurance was provided that COVID-19 has not been as problematic for my patients in the past year.  She will review these medications further and will notify me if she would like to give them a try.  She is aware that recurrent use of steroids comes with risks as well.    Plan:   -Continue Copaxone  - Consider Ocrevus or kesimpta  - Follow-up in 3 to 4 months    Note was completed with the assistance of Dragon Fluency software which can often result in accidental word substitutions.     A total of 30 minutes on the date of service were spent in the care of this patient.   Rashida Lmion MD on 5/18/2023 at 9:45 AM            Again, thank you for allowing me to participate in the care of your patient.        Sincerely,        Rashida Limon MD

## 2023-07-07 ENCOUNTER — TRANSFERRED RECORDS (OUTPATIENT)
Dept: NEUROLOGY | Facility: CLINIC | Age: 68
End: 2023-07-07
Payer: COMMERCIAL

## 2023-07-14 ENCOUNTER — DOCUMENTATION ONLY (OUTPATIENT)
Dept: NEUROLOGY | Facility: CLINIC | Age: 68
End: 2023-07-14
Payer: COMMERCIAL

## 2023-07-14 NOTE — PROGRESS NOTES
Plan of care received from [facility] ([place]), placed in Dr. Limon's folder for review and signature.  Darian Carlos EMT 07/14/2023 10:44AM

## 2023-07-17 NOTE — PROGRESS NOTES
Plan of care has been signed and faxed back to [facility] ([place]) at [phone]   Darian Carlos EMT 07/17/2023 10:02AM

## 2023-10-19 ENCOUNTER — OFFICE VISIT (OUTPATIENT)
Dept: NEUROLOGY | Facility: CLINIC | Age: 68
End: 2023-10-19
Payer: COMMERCIAL

## 2023-10-19 VITALS — DIASTOLIC BLOOD PRESSURE: 78 MMHG | SYSTOLIC BLOOD PRESSURE: 118 MMHG | HEART RATE: 61 BPM

## 2023-10-19 DIAGNOSIS — G35 MS (MULTIPLE SCLEROSIS) (H): Primary | ICD-10-CM

## 2023-10-19 PROCEDURE — 99214 OFFICE O/P EST MOD 30 MIN: CPT | Performed by: PSYCHIATRY & NEUROLOGY

## 2023-10-19 NOTE — PROGRESS NOTES
Date of Service: 10/19/2023    [facility] Neurology   MS Clinic Evaluation    Subjective: 68-year-old woman with thyroiditis, asthma, who presents in follow-up for multiple sclerosis.    She does not report any discrete new symptoms related to multiple sclerosis, but generally feels that things are getting worse.  Gait has been limited by balance.  She was doing some intense exercising which she did find beneficial and was noticing improvement in strength.  However this started to cause left knee pain.  She had to cut back.  She has subsequently been battling increased stress and admits that she has not kept up with her exercise routine.    She notes that things that are quite simple such as standing to put on make-up will cause her legs to tire out.  She experiences some discomfort when she goes from sitting to standing.  She has spasms in her legs that will sometimes also affect the entire body.  She has had 2 falls since her last visit with me.  1 of these occurred because she turned too fast, but the other was because the cat ran in front of her.    She is able to walk long distances if she has a hold onto.  When she goes to the mall she is able to visit approximately 3 stores before she needs to stop and take a break.    Has been struggling more with brain fog that makes it hard for her to find words.  She does remain active by doing cognitively stimulating activities.    She has had a couple episodes of dizziness.  It is very brief in nature and could occur when just sitting.    Disease onset: age 35, R ON  Last relapse: 8/2018, age 63 T 7 myelitis GD +  Prior DMDs:   Copaxone daily 2002 - 2015, injection fatigue  Copaxone 40 mg X2 injections in late 2015, but experienced significant pain with injections  Glatiramer 40 mg 12/2018 - present      Allergies   Allergen Reactions    Sulfa Antibiotics        Current Outpatient Medications   Medication    calcium carbonate-vitamin D (OSCAL W/D) 500-200 MG-UNIT tablet     cholecalciferol 25 MCG (1000 UT) TABS    glatiramer (COPAXONE) 20 MG/ML injection    levothyroxine (SYNTHROID/LEVOTHROID) 100 MCG tablet    vitamin B-12 (CYANOCOBALAMIN) 1000 MCG tablet    methylPREDNISolone (MEDROL DOSEPAK) 4 MG tablet therapy pack    triamcinolone (KENALOG) 0.1 % external cream     No current facility-administered medications for this visit.        Past medical, surgical, social and family history was personally reviewed. Pertinent details noted above.     Physical Examination:   /78 (BP Location: Right arm, Patient Position: Sitting, Cuff Size: Adult Regular)   Pulse 61     General: no acute distress  Cranial nerves:   VFFC  PER b/l  EOM full w/no SAMINA   Face symmetric  Hearing intact  No dysarthria   Motor:   Tone is normal   Bulk is normal     R L  Finger ext 5- 5-  Finger abd 5 5    Hip flexion 5- 4  Knee flexion 5 5  Knee ext 5 5  Ankle d/f 5- 4+    Reflexes: brisk in the LE, no ankle clonus  Sensory: vibration is mildly reduced in the ankles, moderately reduced in the toes, JPS intact  Romberg is present  Coordination: mild ataxia LLE  Gait: spastic ataxic gait, tandem impaired, able to get up from chair without arms    Tests/Imaging:   Vitamin D 48  B12 483    MRI brain   7/2006 - personally reviewed, overall low lesion burden, few faint periventricular lesions, some dirty white matter in cerebellar peduncles b/l, gd-  10/2011 - no new lesions, gd-  5/2014 - no new lesions, gd-  2/2019 - no new lesions, gd-  9/2022 - no new lesions, gd-     MRI cervical spine  5/2014- personally reviewed, dorsal C3, L lat C4/5, ?R lat C5/C6, central C7/T1, gd-, compared to 2006, possibly lesions at R c5/6 and central C7/t1 are new in 2014 but quality hard to compare  2/2019 - 2 new lesions w/in the right lateral cord, gd-  9/2022 - no new lesions, gd-     MRI thoracic - not previously done  2/2019 - approximately 5 lesions, 1 mid thoracic cord lesion gd+ anterior cord   9/2022 - no new lesions, gd-      Assessment: 68-year-old woman with chronic multiple sclerosis who has progressive symptoms related to old lesions.  Radiologically she has been stable.    Reassurance was provided that her examination today reveals a slight improvement in strength.    I do think that she would benefit significantly from adhering to a stretching routine.  We discussed this in detail.  She may also try magnesium.    Reassurance was provided that the vaccinations (COVID, RSV, flu (are encouraged.  Rationale was discussed    Plan:   Follow-up in 6-month    Note was completed with the assistance of Dragon Fluency software which can often result in accidental word substitutions.     A total of 30 minutes on the date of service were spent in the care of this patient.   Rashida Limon MD on 10/19/2023 at 10:20 AM

## 2023-10-19 NOTE — PATIENT INSTRUCTIONS
Stretch for 15-20 minutes twice per day   This will help loosen up your muscles to make walking a little easier   It can also help reduce the frequency of spasms     You can also try a magnesium supplement   Magnesium L threonate is better absorbed and less likely to cause loose stools     Follow up in 6 months

## 2023-10-19 NOTE — LETTER
10/19/2023         RE: Stacey Garcia  [address] [facility] [address]        Dear Colleague,    Thank you for referring your patient, Stacey Garcia, to the [facility] NEUROLOGY CLINIC [facility]. Please see a copy of my visit note below.    Date of Service: 10/19/2023    [facility] Neurology   MS Clinic Evaluation    Subjective: 68-year-old woman with thyroiditis, asthma, who presents in follow-up for multiple sclerosis.    She does not report any discrete new symptoms related to multiple sclerosis, but generally feels that things are getting worse.  Gait has been limited by balance.  She was doing some intense exercising which she did find beneficial and was noticing improvement in strength.  However this started to cause left knee pain.  She had to cut back.  She has subsequently been battling increased stress and admits that she has not kept up with her exercise routine.    She notes that things that are quite simple such as standing to put on make-up will cause her legs to tire out.  She experiences some discomfort when she goes from sitting to standing.  She has spasms in her legs that will sometimes also affect the entire body.  She has had 2 falls since her last visit with me.  1 of these occurred because she turned too fast, but the other was because the cat ran in front of her.    She is able to walk long distances if she has a hold onto.  When she goes to the mall she is able to visit approximately 3 stores before she needs to stop and take a break.    Has been struggling more with brain fog that makes it hard for her to find words.  She does remain active by doing cognitively stimulating activities.    She has had a couple episodes of dizziness.  It is very brief in nature and could occur when just sitting.    Disease onset: age 35, R ON  Last relapse: 8/2018, age 63 T 7 myelitis GD +  Prior DMDs:   Copaxone daily 2002 - 2015, injection fatigue  Copaxone 40 mg X2 injections in late  2015, but experienced significant pain with injections  Glatiramer 40 mg 12/2018 - present      Allergies   Allergen Reactions     Sulfa Antibiotics        Current Outpatient Medications   Medication     calcium carbonate-vitamin D (OSCAL W/D) 500-200 MG-UNIT tablet     cholecalciferol 25 MCG (1000 UT) TABS     glatiramer (COPAXONE) 20 MG/ML injection     levothyroxine (SYNTHROID/LEVOTHROID) 100 MCG tablet     vitamin B-12 (CYANOCOBALAMIN) 1000 MCG tablet     methylPREDNISolone (MEDROL DOSEPAK) 4 MG tablet therapy pack     triamcinolone (KENALOG) 0.1 % external cream     No current facility-administered medications for this visit.        Past medical, surgical, social and family history was personally reviewed. Pertinent details noted above.     Physical Examination:   /78 (BP Location: Right arm, Patient Position: Sitting, Cuff Size: Adult Regular)   Pulse 61     General: no acute distress  Cranial nerves:   VFFC  PER b/l  EOM full w/no SAMINA   Face symmetric  Hearing intact  No dysarthria   Motor:   Tone is normal   Bulk is normal     R L  Finger ext 5- 5-  Finger abd 5 5    Hip flexion 5- 4  Knee flexion 5 5  Knee ext 5 5  Ankle d/f 5- 4+    Reflexes: brisk in the LE, no ankle clonus  Sensory: vibration is mildly reduced in the ankles, moderately reduced in the toes, JPS intact  Romberg is present  Coordination: mild ataxia LLE  Gait: spastic ataxic gait, tandem impaired, able to get up from chair without arms    Tests/Imaging:   Vitamin D 48  B12 483    MRI brain   7/2006 - personally reviewed, overall low lesion burden, few faint periventricular lesions, some dirty white matter in cerebellar peduncles b/l, gd-  10/2011 - no new lesions, gd-  5/2014 - no new lesions, gd-  2/2019 - no new lesions, gd-  9/2022 - no new lesions, gd-     MRI cervical spine  5/2014- personally reviewed, dorsal C3, L lat C4/5, ?R lat C5/C6, central C7/T1, gd-, compared to 2006, possibly lesions at R c5/6 and central C7/t1 are new  in 2014 but quality hard to compare  2/2019 - 2 new lesions w/in the right lateral cord, gd-  9/2022 - no new lesions, gd-     MRI thoracic - not previously done  2/2019 - approximately 5 lesions, 1 mid thoracic cord lesion gd+ anterior cord   9/2022 - no new lesions, gd-     Assessment: 68-year-old woman with chronic multiple sclerosis who has progressive symptoms related to old lesions.  Radiologically she has been stable.    Reassurance was provided that her examination today reveals a slight improvement in strength.    I do think that she would benefit significantly from adhering to a stretching routine.  We discussed this in detail.  She may also try magnesium.    Reassurance was provided that the vaccinations (COVID, RSV, flu (are encouraged.  Rationale was discussed    Plan:   Follow-up in 6-month    Note was completed with the assistance of Dragon Fluency software which can often result in accidental word substitutions.     A total of 30 minutes on the date of service were spent in the care of this patient.   Rashida Limon MD on 10/19/2023 at 10:20 AM            Again, thank you for allowing me to participate in the care of your patient.        Sincerely,        Rashida Limon MD

## 2023-10-29 ENCOUNTER — HEALTH MAINTENANCE LETTER (OUTPATIENT)
Age: 68
End: 2023-10-29

## 2024-04-03 ENCOUNTER — MYC MEDICAL ADVICE (OUTPATIENT)
Dept: NEUROLOGY | Facility: CLINIC | Age: 69
End: 2024-04-03
Payer: COMMERCIAL

## 2024-04-03 DIAGNOSIS — G35 MS (MULTIPLE SCLEROSIS) (H): Primary | ICD-10-CM

## 2024-04-09 RX ORDER — PREDNISONE 50 MG/1
TABLET ORAL
Qty: 37 TABLET | Refills: 0 | Status: SHIPPED | OUTPATIENT
Start: 2024-04-09 | End: 2024-04-17

## 2024-06-20 ENCOUNTER — TELEPHONE (OUTPATIENT)
Dept: NEUROLOGY | Facility: CLINIC | Age: 69
End: 2024-06-20

## 2024-06-20 ENCOUNTER — OFFICE VISIT (OUTPATIENT)
Dept: NEUROLOGY | Facility: CLINIC | Age: 69
End: 2024-06-20
Payer: COMMERCIAL

## 2024-06-20 VITALS — HEART RATE: 66 BPM | SYSTOLIC BLOOD PRESSURE: 128 MMHG | DIASTOLIC BLOOD PRESSURE: 81 MMHG

## 2024-06-20 DIAGNOSIS — G82.20 PARAPARESIS (H): ICD-10-CM

## 2024-06-20 DIAGNOSIS — R26.81 GAIT INSTABILITY: ICD-10-CM

## 2024-06-20 DIAGNOSIS — G35 MS (MULTIPLE SCLEROSIS) (H): Primary | ICD-10-CM

## 2024-06-20 PROCEDURE — 99214 OFFICE O/P EST MOD 30 MIN: CPT | Performed by: PSYCHIATRY & NEUROLOGY

## 2024-06-20 RX ORDER — DALFAMPRIDINE 10 MG/1
10 TABLET, FILM COATED, EXTENDED RELEASE ORAL 2 TIMES DAILY
Qty: 60 TABLET | Refills: 11 | Status: SHIPPED | OUTPATIENT
Start: 2024-06-20

## 2024-06-20 RX ORDER — GLATIRAMER ACETATE 20 MG/ML
20 INJECTION, SOLUTION SUBCUTANEOUS DAILY
Qty: 30 ML | Refills: 11 | Status: SHIPPED | OUTPATIENT
Start: 2024-06-20

## 2024-06-20 NOTE — LETTER
6/20/2024      Stacey Garcia  [address] [place] [id number]      Dear Colleague,    Thank you for referring your patient, Stacey Garcia, to the [facility] NEUROLOGY CLINIC [facility]. Please see a copy of my visit note below.    Date of Service: 6/20/2024    [facility] Neurology   MS Clinic Evaluation    Subjective: 68-year-old woman with thyroiditis, asthma, who presents in follow-up for multiple sclerosis.    She has been struggling with gait.  She feels more unsteady.  She noted that when she was trying to walk into a PT appointment she appeared as though she was drunk.  She is trying to do her exercises routinely, but with her exercise she will have right hip pain.  This can limit her activity.  She is trying to strengthen her left leg.    She had reached out to me in April due to concerns of gait.  I had prescribed steroids, but she elected not to take them as she notes that the effect usually last 1 week.    She continues to do glatiramer acetate.  Injections are going okay.  No issues with this medication.    She states that her legs feel like they are encased in steel.  Her knees feel like they are porcupine's if she is leaning on them.  She feels as though she clinically declined substantially after having the knee replacements.    She has had a couple falls since her last visit with me.  1 occurred at home because cats jumped in front of her feet.  The other occurred at a restaurant because her foot caught on the tile.  She is pending a bone density test.    She struggles with neurogenic bladder that affects frequency and urgency.    She has no prior history of seizure.    Disease onset: age 35, R ON  Last relapse: 8/2018, age 63 T 7 myelitis GD +  Prior DMDs:   Copaxone daily 2002 - 2015, injection fatigue  Copaxone 40 mg X2 injections in late 2015, but experienced significant pain with injections  Glatiramer 40 mg 12/2018 - present      Allergies   Allergen Reactions     Sulfa  Antibiotics        Current Outpatient Medications   Medication Sig Dispense Refill     calcium carbonate-vitamin D (OSCAL W/D) 500-200 MG-UNIT tablet Take 1 tablet by mouth daily       cholecalciferol 25 MCG (1000 UT) TABS Take 1,000 Units by mouth daily       glatiramer (COPAXONE) 20 MG/ML injection Inject 20 mg Subcutaneous daily       levothyroxine (SYNTHROID/LEVOTHROID) 100 MCG tablet Take 1 tablet (100 mcg) by mouth daily       vitamin B-12 (CYANOCOBALAMIN) 1000 MCG tablet Take 1 tablet (1,000 mcg) by mouth daily       No current facility-administered medications for this visit.        Past medical, surgical, social and family history was personally reviewed. Pertinent details noted above.     Physical Examination:   /81 (BP Location: Right arm, Patient Position: Sitting, Cuff Size: Adult Regular)   Pulse 66     General: no acute distress  Cranial nerves:   VFFC  PER b/l  EOM full w/no SAMINA   Face symmetric  Hearing intact  No dysarthria   Motor:   Tone is normal   Bulk is normal     R L  Finger ext 5- 5-  Finger abd 5 5    Hip flexion 5 4  Knee flexion 5 5  Knee ext 5 5  Ankle d/f 5- 4+    Reflexes: brisk in the LE, no ankle clonus  Sensory: vibration is moderately reduced in the toes, JPS mildly reduced in the toes  Romberg is present  Coordination: mild ataxia LLE  Gait: spastic ataxic gait, tandem impaired, able to get up from chair without arms  25 foot walk 8.5 seconds    Tests/Imaging:   Vitamin D 48  B12 483    MRI brain   7/2006 - personally reviewed, overall low lesion burden, few faint periventricular lesions, some dirty white matter in cerebellar peduncles b/l, gd-  10/2011 - no new lesions, gd-  5/2014 - no new lesions, gd-  2/2019 - no new lesions, gd-  9/2022 - no new lesions, gd-     MRI cervical spine  5/2014- personally reviewed, dorsal C3, L lat C4/5, ?R lat C5/C6, central C7/T1, gd-, compared to 2006, possibly lesions at R c5/6 and central C7/t1 are new in 2014 but quality hard to  compare  2/2019 - 2 new lesions w/in the right lateral cord, gd-  9/2022 - no new lesions, gd-     MRI thoracic - not previously done  2/2019 - approximately 5 lesions, 1 mid thoracic cord lesion gd+ anterior cord   9/2022 - no new lesions, gd-     Assessment: 68-year-old woman with chronic multiple sclerosis who has progressive symptoms related to old lesions.  Radiologically she has been stable.    I recommended a trial of Ampyra.  Risks and side effects were discussed in detail.  She is agreeable to trying this medication.  Creatinine will be assessed today.    Otherwise she does appear little bit stronger in the right hip.  She is encouraged to keep up with physical therapy and do her exercises routinely.    Plan:   -Ampyra trial  - Creatinine today  - Continue glatiramer  - Follow-up in 6 months    Note was completed with the assistance of Dragon Fluency software which can often result in accidental word substitutions.     A total of 30 minutes on the date of service were spent in the care of this patient.   Rashida Limon MD on 6/20/2024 at 11:07 AM              Again, thank you for allowing me to participate in the care of your patient.        Sincerely,        Rashida Limon MD

## 2024-06-20 NOTE — TELEPHONE ENCOUNTER
PA Initiation    Medication: DALFAMPRIDINE ER 10 MG PO TB12  Insurance Company: [employer] - Phone [phone] Fax [phone]  Pharmacy Filling the Rx: [facility] - [place], [place] - [facility] [address]  Filling Pharmacy Phone:    Filling Pharmacy Fax:    Start Date: 6/20/2024    [id number]

## 2024-06-20 NOTE — PATIENT INSTRUCTIONS
Continue glatiramer     I Have recommended you try ampyra/dalfampridine for your walking   It can help walking speed, stability, and endurance   Only 1/3 people get benefit from this medication - if no benefit within the first month, do not order more of the medication     Blood work to check kidney function    Follow up in 6 months

## 2024-06-20 NOTE — NURSING NOTE
Chief Complaint   Patient presents with    Follow Up     DEQUAN Serrano on 6/20/2024 at 10:58 AM

## 2024-06-20 NOTE — PROGRESS NOTES
Date of Service: 6/20/2024    [facility] Neurology   MS Clinic Evaluation    Subjective: 68-year-old woman with thyroiditis, asthma, who presents in follow-up for multiple sclerosis.    She has been struggling with gait.  She feels more unsteady.  She noted that when she was trying to walk into a PT appointment she appeared as though she was drunk.  She is trying to do her exercises routinely, but with her exercise she will have right hip pain.  This can limit her activity.  She is trying to strengthen her left leg.    She had reached out to me in April due to concerns of gait.  I had prescribed steroids, but she elected not to take them as she notes that the effect usually last 1 week.    She continues to do glatiramer acetate.  Injections are going okay.  No issues with this medication.    She states that her legs feel like they are encased in steel.  Her knees feel like they are porcupine's if she is leaning on them.  She feels as though she clinically declined substantially after having the knee replacements.    She has had a couple falls since her last visit with me.  1 occurred at home because cats jumped in front of her feet.  The other occurred at a restaurant because her foot caught on the tile.  She is pending a bone density test.    She struggles with neurogenic bladder that affects frequency and urgency.    She has no prior history of seizure.    Disease onset: age 35, R ON  Last relapse: 8/2018, age 63 T 7 myelitis GD +  Prior DMDs:   Copaxone daily 2002 - 2015, injection fatigue  Copaxone 40 mg X2 injections in late 2015, but experienced significant pain with injections  Glatiramer 40 mg 12/2018 - present      Allergies   Allergen Reactions    Sulfa Antibiotics        Current Outpatient Medications   Medication Sig Dispense Refill    calcium carbonate-vitamin D (OSCAL W/D) 500-200 MG-UNIT tablet Take 1 tablet by mouth daily      cholecalciferol 25 MCG (1000 UT) TABS Take 1,000 Units by mouth daily       glatiramer (COPAXONE) 20 MG/ML injection Inject 20 mg Subcutaneous daily      levothyroxine (SYNTHROID/LEVOTHROID) 100 MCG tablet Take 1 tablet (100 mcg) by mouth daily      vitamin B-12 (CYANOCOBALAMIN) 1000 MCG tablet Take 1 tablet (1,000 mcg) by mouth daily       No current facility-administered medications for this visit.        Past medical, surgical, social and family history was personally reviewed. Pertinent details noted above.     Physical Examination:   /81 (BP Location: Right arm, Patient Position: Sitting, Cuff Size: Adult Regular)   Pulse 66     General: no acute distress  Cranial nerves:   VFFC  PER b/l  EOM full w/no SAMINA   Face symmetric  Hearing intact  No dysarthria   Motor:   Tone is normal   Bulk is normal     R L  Finger ext 5- 5-  Finger abd 5 5    Hip flexion 5 4  Knee flexion 5 5  Knee ext 5 5  Ankle d/f 5- 4+    Reflexes: brisk in the LE, no ankle clonus  Sensory: vibration is moderately reduced in the toes, JPS mildly reduced in the toes  Romberg is present  Coordination: mild ataxia LLE  Gait: spastic ataxic gait, tandem impaired, able to get up from chair without arms  25 foot walk 8.5 seconds    Tests/Imaging:   Vitamin D 48  B12 483    MRI brain   7/2006 - personally reviewed, overall low lesion burden, few faint periventricular lesions, some dirty white matter in cerebellar peduncles b/l, gd-  10/2011 - no new lesions, gd-  5/2014 - no new lesions, gd-  2/2019 - no new lesions, gd-  9/2022 - no new lesions, gd-     MRI cervical spine  5/2014- personally reviewed, dorsal C3, L lat C4/5, ?R lat C5/C6, central C7/T1, gd-, compared to 2006, possibly lesions at R c5/6 and central C7/t1 are new in 2014 but quality hard to compare  2/2019 - 2 new lesions w/in the right lateral cord, gd-  9/2022 - no new lesions, gd-     MRI thoracic - not previously done  2/2019 - approximately 5 lesions, 1 mid thoracic cord lesion gd+ anterior cord   9/2022 - no new lesions, gd-     Assessment:  68-year-old woman with chronic multiple sclerosis who has progressive symptoms related to old lesions.  Radiologically she has been stable.    I recommended a trial of Ampyra.  Risks and side effects were discussed in detail.  She is agreeable to trying this medication.  Creatinine will be assessed today.    Otherwise she does appear little bit stronger in the right hip.  She is encouraged to keep up with physical therapy and do her exercises routinely.    Plan:   -Ampyra trial  - Creatinine today  - Continue glatiramer  - Follow-up in 6 months    Note was completed with the assistance of Dragon Fluency software which can often result in accidental word substitutions.     A total of 30 minutes on the date of service were spent in the care of this patient.   Rashida Limon MD on 6/20/2024 at 11:07 AM

## 2024-06-21 NOTE — TELEPHONE ENCOUNTER
Prior Authorization Approval    Medication: DALFAMPRIDINE ER 10 MG PO TB12  Authorization Effective Date: 5/21/2024  Authorization Expiration Date: 12/20/2024  Approved Dose/Quantity: 60 tabs per 30 days  Reference #: [id number]   Insurance Company: [employer] - Phone [phone] Fax [phone]  Expected CoPay: $ 10  CoPay Card Available: No    Financial Assistance Needed: N/A  Which Pharmacy is filling the prescription: [facility] - [place], [place] - [address]  Pharmacy Notified: Released Rx  Patient Notified: Yes

## 2024-11-22 ENCOUNTER — TELEPHONE (OUTPATIENT)
Dept: NEUROLOGY | Facility: CLINIC | Age: 69
End: 2024-11-22
Payer: COMMERCIAL

## 2024-11-22 NOTE — TELEPHONE ENCOUNTER
Pt has not filled since 7/1 and Pt has not been seen for follow up.  Shall we attempt a new PA renewal

## 2024-11-25 NOTE — TELEPHONE ENCOUNTER
LMTRC. Would like to know if she is currently taking Ampyra, and if not, why? Per MD note, she recommended trial at last appointment. Pharmacy team notes pt has not picked up since July and need to know if PA should be renewed.     Meeta SANTIAGO RN, BSN  [facility] Neurology

## 2024-11-27 NOTE — TELEPHONE ENCOUNTER
Spoke with Stacey regarding Ampyra. Patient did not trial this medication and is not interested at this time, no PA renewal needed.     Meeta SANTIAGO RN, BSN  [facility] Neurology

## 2024-12-21 ENCOUNTER — HEALTH MAINTENANCE LETTER (OUTPATIENT)
Age: 69
End: 2024-12-21

## 2024-12-23 ENCOUNTER — OFFICE VISIT (OUTPATIENT)
Dept: NEUROLOGY | Facility: CLINIC | Age: 69
End: 2024-12-23
Payer: COMMERCIAL

## 2024-12-23 VITALS — DIASTOLIC BLOOD PRESSURE: 79 MMHG | HEART RATE: 66 BPM | SYSTOLIC BLOOD PRESSURE: 124 MMHG

## 2024-12-23 DIAGNOSIS — M79.2 NEUROPATHIC PAIN: ICD-10-CM

## 2024-12-23 DIAGNOSIS — G35 MS (MULTIPLE SCLEROSIS) (H): Primary | ICD-10-CM

## 2024-12-23 DIAGNOSIS — R26.81 GAIT INSTABILITY: ICD-10-CM

## 2024-12-23 DIAGNOSIS — G82.20 PARAPARESIS (H): ICD-10-CM

## 2024-12-23 PROCEDURE — 99214 OFFICE O/P EST MOD 30 MIN: CPT | Performed by: PSYCHIATRY & NEUROLOGY

## 2024-12-23 NOTE — PROGRESS NOTES
Date of Service: 12/23/2024    [facility] Neurology   MS Clinic Evaluation    Subjective: 69-year-old woman with thyroiditis, asthma, who presents in follow-up for multiple sclerosis.    She shares today that she has been struggling with gait.  She has been doing exercises that help with balance and strength.  She is still attending motion physical therapy.  She does willard chi.    She still has a sensation that her legs feel wrapped.  They have a fire like sensation at night.  The severity/intensity of symptoms is variable by the night.    She has a tendency to hyperextend her left knee.  She Rippel relies on the support of her  whenever she goes out.    She had a fall in a restaurant.  Her foot got caught on a tile.    She tried Ampyra for approximately 5 days, but did not notice any benefit and if anything fell like she might have been doing a little bit worse elected to remain off of the medication.    She notes some word finding difficulties, word substitutions, and perhaps some impulsivity.  This is frustrating for her.  She notes that she is normally very detail oriented.    Her knees are very sensitive/hyperalgesic.    Her feet get sore like she is walking on rocks.  This is new in the past several months. She is noted to have hammertoes. She notes that this has occurred to many family members in their 50s.      Disease onset: age 35, R ON  Last relapse: 8/2018, age 63 T 7 myelitis GD +    Prior DMDs:   Copaxone daily 2002 - 2015, injection fatigue  Copaxone 40 mg X2 injections in late 2015, but experienced significant pain with injections  Glatiramer 40 mg 12/2018 - present      Allergies   Allergen Reactions    Sulfa Antibiotics        Current Outpatient Medications   Medication Sig Dispense Refill    glatiramer (COPAXONE) 20 MG/ML injection Inject 1 mL (20 mg) Subcutaneous daily 30 mL 11    levothyroxine (SYNTHROID/LEVOTHROID) 100 MCG tablet Take 1 tablet (100 mcg) by mouth daily      calcium  carbonate-vitamin D (OSCAL W/D) 500-200 MG-UNIT tablet Take 1 tablet by mouth daily (Patient not taking: Reported on 12/23/2024)      cholecalciferol 25 MCG (1000 UT) TABS Take 1,000 Units by mouth daily (Patient not taking: Reported on 12/23/2024)      vitamin B-12 (CYANOCOBALAMIN) 1000 MCG tablet Take 1 tablet (1,000 mcg) by mouth daily (Patient not taking: Reported on 12/23/2024)       No current facility-administered medications for this visit.        Past medical, surgical, social and family history was personally reviewed. Pertinent details noted above.     Physical Examination:   /79 (BP Location: Right arm, Patient Position: Sitting, Cuff Size: Adult Large)   Pulse 66     General: no acute distress  Cranial nerves:   VFFC  PER b/l  EOM full w/no SAMINA   Face symmetric  Hearing intact  No dysarthria   Motor:   Tone is normal   Bulk is normal     R L  Finger ext 5- 5-  Finger abd 5 5    Hip flexion 5 4+  Knee flexion 5 5  Knee ext 5 5  Ankle d/f 5- 4+    Reflexes: reduced in the LE, no ankle clonus  Sensory: vibration is moderately/severely reduced in the toes, JPS mildly reduced in the toes  Romberg is present  Coordination: mild ataxia LLE  Gait: spastic ataxic gait, tandem impaired, able to get up from chair without arms    Tests/Imaging:   Vitamin D 48  B12 483    MRI brain   7/2006 - personally reviewed, overall low lesion burden, few faint periventricular lesions, some dirty white matter in cerebellar peduncles b/l, gd-  10/2011 - no new lesions, gd-  5/2014 - no new lesions, gd-  2/2019 - no new lesions, gd-  9/2022 - no new lesions, gd-     MRI cervical spine  5/2014- personally reviewed, dorsal C3, L lat C4/5, ?R lat C5/C6, central C7/T1, gd-, compared to 2006, possibly lesions at R c5/6 and central C7/t1 are new in 2014 but quality hard to compare  2/2019 - 2 new lesions w/in the right lateral cord, gd-  9/2022 - no new lesions, gd-     MRI thoracic - not previously done  2/2019 - approximately  5 lesions, 1 mid thoracic cord lesion gd+ anterior cord   9/2022 - no new lesions, gd-     Assessment: 69-year-old woman with chronic multiple sclerosis who has progressive symptoms related to old lesions.  Radiologically she has been stable.    Examination today reveals a slight improvement in strength of the left leg. However, functionally she feels she is doing worse. I have recommended one more MRI to be done in 6 months     She can continue glatiramer.     I shared concern that she might also have neuropathy.  Given her family history, this may even be CMT. However, she inquired if there are any treatments for neuropathy. Blood testing for reversible causes has been performed within the last two years and was negative.  She was not interested in pursuing the EMG at this time.     Plan:   - continue glatiramer   - mri in 6 months   - follow up after MRI     Note was completed with the assistance of Dragon Fluency software which can often result in accidental word substitutions.     A total of 30 minutes on the date of service were spent in the care of this patient.   Rashida Limon MD on 12/23/2024 at 9:42 AM

## 2024-12-23 NOTE — NURSING NOTE
Chief Complaint   Patient presents with    Follow Up       DEQUAN Leos on 12/23/2024 at 9:45 AM

## 2024-12-23 NOTE — LETTER
12/23/2024      Stacey Garcia  [address] [place] [id number]      Dear Colleague,    Thank you for referring your patient, Stacey Garcia, to the [facility] NEUROLOGY CLINIC [facility]. Please see a copy of my visit note below.    Date of Service: 12/23/2024    [facility] Neurology   MS Clinic Evaluation    Subjective: 69-year-old woman with thyroiditis, asthma, who presents in follow-up for multiple sclerosis.    She shares today that she has been struggling with gait.  She has been doing exercises that help with balance and strength.  She is still attending motion physical therapy.  She does willard chi.    She still has a sensation that her legs feel wrapped.  They have a fire like sensation at night.  The severity/intensity of symptoms is variable by the night.    She has a tendency to hyperextend her left knee.  She Rippel relies on the support of her  whenever she goes out.    She had a fall in a restaurant.  Her foot got caught on a tile.    She tried Ampyra for approximately 5 days, but did not notice any benefit and if anything fell like she might have been doing a little bit worse elected to remain off of the medication.    She notes some word finding difficulties, word substitutions, and perhaps some impulsivity.  This is frustrating for her.  She notes that she is normally very detail oriented.    Her knees are very sensitive/hyperalgesic.    Her feet get sore like she is walking on rocks.  This is new in the past several months. She is noted to have hammertoes. She notes that this has occurred to many family members in their 50s.      Disease onset: age 35, R ON  Last relapse: 8/2018, age 63 T 7 myelitis GD +    Prior DMDs:   Copaxone daily 2002 - 2015, injection fatigue  Copaxone 40 mg X2 injections in late 2015, but experienced significant pain with injections  Glatiramer 40 mg 12/2018 - present      Allergies   Allergen Reactions     Sulfa Antibiotics        Current Outpatient  Medications   Medication Sig Dispense Refill     glatiramer (COPAXONE) 20 MG/ML injection Inject 1 mL (20 mg) Subcutaneous daily 30 mL 11     levothyroxine (SYNTHROID/LEVOTHROID) 100 MCG tablet Take 1 tablet (100 mcg) by mouth daily       calcium carbonate-vitamin D (OSCAL W/D) 500-200 MG-UNIT tablet Take 1 tablet by mouth daily (Patient not taking: Reported on 12/23/2024)       cholecalciferol 25 MCG (1000 UT) TABS Take 1,000 Units by mouth daily (Patient not taking: Reported on 12/23/2024)       vitamin B-12 (CYANOCOBALAMIN) 1000 MCG tablet Take 1 tablet (1,000 mcg) by mouth daily (Patient not taking: Reported on 12/23/2024)       No current facility-administered medications for this visit.        Past medical, surgical, social and family history was personally reviewed. Pertinent details noted above.     Physical Examination:   /79 (BP Location: Right arm, Patient Position: Sitting, Cuff Size: Adult Large)   Pulse 66     General: no acute distress  Cranial nerves:   VFFC  PER b/l  EOM full w/no SAMINA   Face symmetric  Hearing intact  No dysarthria   Motor:   Tone is normal   Bulk is normal     R L  Finger ext 5- 5-  Finger abd 5 5    Hip flexion 5 4+  Knee flexion 5 5  Knee ext 5 5  Ankle d/f 5- 4+    Reflexes: reduced in the LE, no ankle clonus  Sensory: vibration is moderately/severely reduced in the toes, JPS mildly reduced in the toes  Romberg is present  Coordination: mild ataxia LLE  Gait: spastic ataxic gait, tandem impaired, able to get up from chair without arms    Tests/Imaging:   Vitamin D 48  B12 483    MRI brain   7/2006 - personally reviewed, overall low lesion burden, few faint periventricular lesions, some dirty white matter in cerebellar peduncles b/l, gd-  10/2011 - no new lesions, gd-  5/2014 - no new lesions, gd-  2/2019 - no new lesions, gd-  9/2022 - no new lesions, gd-     MRI cervical spine  5/2014- personally reviewed, dorsal C3, L lat C4/5, ?R lat C5/C6, central C7/T1, gd-, compared  to 2006, possibly lesions at R c5/6 and central C7/t1 are new in 2014 but quality hard to compare  2/2019 - 2 new lesions w/in the right lateral cord, gd-  9/2022 - no new lesions, gd-     MRI thoracic - not previously done  2/2019 - approximately 5 lesions, 1 mid thoracic cord lesion gd+ anterior cord   9/2022 - no new lesions, gd-     Assessment: 69-year-old woman with chronic multiple sclerosis who has progressive symptoms related to old lesions.  Radiologically she has been stable.    Examination today reveals a slight improvement in strength of the left leg. However, functionally she feels she is doing worse. I have recommended one more MRI to be done in 6 months     She can continue glatiramer.     I shared concern that she might also have neuropathy.  Given her family history, this may even be CMT. However, she inquired if there are any treatments for neuropathy. Blood testing for reversible causes has been performed within the last two years and was negative.  She was not interested in pursuing the EMG at this time.     Plan:   - continue glatiramer   - mri in 6 months   - follow up after MRI     Note was completed with the assistance of Dragon Fluency software which can often result in accidental word substitutions.     A total of 30 minutes on the date of service were spent in the care of this patient.   Rashida Limon MD on 12/23/2024 at 9:42 AM                Again, thank you for allowing me to participate in the care of your patient.        Sincerely,        Rashida Limon MD    Electronically signed

## 2024-12-23 NOTE — PATIENT INSTRUCTIONS
Continue glatiramer     Acupuncture could be helpful for the nerve pain     Primary care - seek in [facility]     Mri in 6 months    Follow up after MRI

## 2025-07-01 DIAGNOSIS — G35 MS (MULTIPLE SCLEROSIS) (H): ICD-10-CM

## 2025-07-01 RX ORDER — GLATIRAMER ACETATE 20 MG/ML
20 INJECTION, SOLUTION SUBCUTANEOUS DAILY
Qty: 30 ML | Refills: 11 | Status: SHIPPED | OUTPATIENT
Start: 2025-07-01

## 2025-08-22 ENCOUNTER — ANCILLARY PROCEDURE (OUTPATIENT)
Dept: MRI IMAGING | Facility: CLINIC | Age: 70
End: 2025-08-22
Attending: PSYCHIATRY & NEUROLOGY
Payer: COMMERCIAL

## 2025-08-22 DIAGNOSIS — G35 MS (MULTIPLE SCLEROSIS) (H): ICD-10-CM

## 2025-08-22 PROCEDURE — 72157 MRI CHEST SPINE W/O & W/DYE: CPT | Mod: TC | Performed by: RADIOLOGY

## 2025-08-22 PROCEDURE — 72156 MRI NECK SPINE W/O & W/DYE: CPT | Mod: TC | Performed by: RADIOLOGY

## 2025-08-22 PROCEDURE — 70553 MRI BRAIN STEM W/O & W/DYE: CPT | Mod: TC | Performed by: RADIOLOGY

## 2025-08-22 PROCEDURE — A9585 GADOBUTROL INJECTION: HCPCS | Performed by: RADIOLOGY

## 2025-08-22 RX ORDER — GADOBUTROL 604.72 MG/ML
0.1 INJECTION INTRAVENOUS ONCE
Status: COMPLETED | OUTPATIENT
Start: 2025-08-22 | End: 2025-08-22

## 2025-08-22 RX ADMIN — GADOBUTROL 7 ML: 604.72 INJECTION INTRAVENOUS at 09:46
